# Patient Record
Sex: FEMALE | HISPANIC OR LATINO
[De-identification: names, ages, dates, MRNs, and addresses within clinical notes are randomized per-mention and may not be internally consistent; named-entity substitution may affect disease eponyms.]

---

## 2017-01-03 ENCOUNTER — APPOINTMENT (OUTPATIENT)
Dept: ULTRASOUND IMAGING | Facility: HOSPITAL | Age: 47
End: 2017-01-03

## 2017-10-03 ENCOUNTER — RESULT REVIEW (OUTPATIENT)
Age: 47
End: 2017-10-03

## 2017-11-15 ENCOUNTER — RESULT REVIEW (OUTPATIENT)
Age: 47
End: 2017-11-15

## 2019-12-31 ENCOUNTER — RESULT REVIEW (OUTPATIENT)
Age: 49
End: 2019-12-31

## 2020-09-28 ENCOUNTER — EMERGENCY (EMERGENCY)
Facility: HOSPITAL | Age: 50
LOS: 0 days | Discharge: HOME | End: 2020-09-28
Payer: COMMERCIAL

## 2020-09-28 VITALS
OXYGEN SATURATION: 100 % | HEART RATE: 79 BPM | TEMPERATURE: 99 F | SYSTOLIC BLOOD PRESSURE: 131 MMHG | DIASTOLIC BLOOD PRESSURE: 76 MMHG | RESPIRATION RATE: 20 BRPM

## 2020-09-28 DIAGNOSIS — Z20.828 CONTACT WITH AND (SUSPECTED) EXPOSURE TO OTHER VIRAL COMMUNICABLE DISEASES: ICD-10-CM

## 2020-09-28 PROCEDURE — U0003: CPT

## 2020-09-28 PROCEDURE — 99283 EMERGENCY DEPT VISIT LOW MDM: CPT

## 2020-09-28 NOTE — ED ADULT NURSE NOTE - OBJECTIVE STATEMENT
pt. presents for COVID testing, no exposure, no symptoms reported pt. evaluated, treated, and discharged by ED PA. Refer to ED PA note for assessment. Discharge instructions given verbally and understood by patient.  COVID-19 information provided to patient.

## 2020-09-28 NOTE — ED STATDOCS - PATIENT PORTAL LINK FT
You can access the FollowMyHealth Patient Portal offered by [facility] by registering at the following website: [email or web address]. By joining [employer]’s FollowMyHealth portal, you will also be able to view your health information using other applications (apps) compatible with our system.

## 2020-09-28 NOTE — ED STATDOCS - OBJECTIVE STATEMENT
49 yo female presents for screening for covid testing. Pt just wants to make sure she doesn't have it.

## 2020-09-29 LAB — SARS-COV-2 RNA SPEC QL NAA+PROBE: SIGNIFICANT CHANGE UP

## 2020-11-13 ENCOUNTER — OUTPATIENT (OUTPATIENT)
Dept: OUTPATIENT SERVICES | Facility: HOSPITAL | Age: 50
LOS: 1 days | End: 2020-11-13
Payer: COMMERCIAL

## 2020-11-13 DIAGNOSIS — Z11.59 ENCOUNTER FOR SCREENING FOR OTHER VIRAL DISEASES: ICD-10-CM

## 2020-11-13 PROBLEM — Z78.9 OTHER SPECIFIED HEALTH STATUS: Chronic | Status: ACTIVE | Noted: 2020-09-28

## 2020-11-13 LAB — SARS-COV-2 RNA SPEC QL NAA+PROBE: SIGNIFICANT CHANGE UP

## 2020-11-13 PROCEDURE — U0003: CPT

## 2020-11-14 DIAGNOSIS — Z11.59 ENCOUNTER FOR SCREENING FOR OTHER VIRAL DISEASES: ICD-10-CM

## 2021-12-27 ENCOUNTER — TRANSCRIPTION ENCOUNTER (OUTPATIENT)
Age: 51
End: 2021-12-27

## 2022-09-29 ENCOUNTER — RESULT REVIEW (OUTPATIENT)
Age: 52
End: 2022-09-29

## 2024-05-06 ENCOUNTER — APPOINTMENT (OUTPATIENT)
Dept: COLORECTAL SURGERY | Facility: CLINIC | Age: 54
End: 2024-05-06
Payer: COMMERCIAL

## 2024-05-06 ENCOUNTER — NON-APPOINTMENT (OUTPATIENT)
Age: 54
End: 2024-05-06

## 2024-05-06 VITALS
TEMPERATURE: 98.2 F | RESPIRATION RATE: 14 BRPM | HEART RATE: 92 BPM | SYSTOLIC BLOOD PRESSURE: 142 MMHG | DIASTOLIC BLOOD PRESSURE: 91 MMHG | HEIGHT: 63 IN | OXYGEN SATURATION: 97 % | WEIGHT: 170 LBS | BODY MASS INDEX: 30.12 KG/M2

## 2024-05-06 DIAGNOSIS — K62.5 HEMORRHAGE OF ANUS AND RECTUM: ICD-10-CM

## 2024-05-06 DIAGNOSIS — K64.5 PERIANAL VENOUS THROMBOSIS: ICD-10-CM

## 2024-05-06 PROCEDURE — 46320 REMOVAL OF HEMORRHOID CLOT: CPT

## 2024-05-06 PROCEDURE — 99204 OFFICE O/P NEW MOD 45 MIN: CPT | Mod: 25

## 2024-05-06 NOTE — ASSESSMENT
[FreeTextEntry1] : Ms. Talbert presents to the office with a left lateral thrombosed external hemorrhoid. I have discussed the etiology for this condition including excess straining with activity or straining to evacuate stools secondary to constipation or diarrhea. In office today, we proceeded to excise the MAGDALENA to good effect. The patient was advised on what to expect post procedure. This includes some mild discomfort which should be readily alleviated by over-the-counter pain medications. Sitz baths will need to be performed to keep the wound site clean to facilitate healing. There will be some mild serosanguineous drainage to be anticipated and this should resolve as the wound heals. Cotton gauze should be placed in undergarments to prevent soilage from drainage. A bowel regimen consisting of a high fiber diet, ample daily water intake and miralax as needed is recommended to prevent further episodes of straining and further episodes of thrombosis. He was also advised to avoid heavy lifting and straining for the next two days to prevent reaccumulation of thrombus.  Worsening bleeding from the site can be addressed by laying in the left lateral decubitus position while holding pressure at the site.

## 2024-05-06 NOTE — PHYSICAL EXAM
[Tender, Swollen] : tender, swollen [Thrombosed] : that was thrombosed [No Rash or Lesion] : No rash or lesion [Alert] : alert [Oriented to Person] : oriented to person [Oriented to Place] : oriented to place [Oriented to Time] : oriented to time [Calm] : calm [de-identified] : left lateral [de-identified] : No apparent distress [de-identified] : Normocephalic atraumatic [de-identified] : Moving all extremities x 4

## 2024-05-06 NOTE — HISTORY OF PRESENT ILLNESS
[FreeTextEntry1] : Ms. Vega presents to the office for consultation secondary to a several week history of a thrombosed external hemorrhoid that is bleeding.  She reports first having noted this hemorrhoid approximately 3 days earlier and suspects it is a result of her new exercise regimen which includes weightlifting.

## 2024-07-12 ENCOUNTER — APPOINTMENT (OUTPATIENT)
Dept: GASTROENTEROLOGY | Facility: CLINIC | Age: 54
End: 2024-07-12